# Patient Record
Sex: MALE | Race: WHITE | NOT HISPANIC OR LATINO | Employment: OTHER | URBAN - NONMETROPOLITAN AREA
[De-identification: names, ages, dates, MRNs, and addresses within clinical notes are randomized per-mention and may not be internally consistent; named-entity substitution may affect disease eponyms.]

---

## 2017-01-11 ENCOUNTER — ANTICOAGULATION VISIT (OUTPATIENT)
Dept: CARDIOLOGY | Facility: CLINIC | Age: 77
End: 2017-01-11

## 2017-01-11 LAB — INR PPP: 4

## 2017-01-13 ENCOUNTER — ANTICOAGULATION VISIT (OUTPATIENT)
Dept: CARDIOLOGY | Facility: CLINIC | Age: 77
End: 2017-01-13

## 2017-01-13 LAB — INR PPP: 2.3

## 2017-01-20 ENCOUNTER — ANTICOAGULATION VISIT (OUTPATIENT)
Dept: CARDIOLOGY | Facility: CLINIC | Age: 77
End: 2017-01-20

## 2017-01-20 LAB — INR PPP: 2.9

## 2017-02-08 ENCOUNTER — TELEPHONE (OUTPATIENT)
Dept: CARDIOLOGY | Facility: CLINIC | Age: 77
End: 2017-02-08

## 2017-02-08 ENCOUNTER — ANTICOAGULATION VISIT (OUTPATIENT)
Dept: CARDIOLOGY | Facility: CLINIC | Age: 77
End: 2017-02-08

## 2017-02-08 LAB — INR PPP: 1.8

## 2017-02-21 ENCOUNTER — ANTICOAGULATION VISIT (OUTPATIENT)
Dept: CARDIOLOGY | Facility: CLINIC | Age: 77
End: 2017-02-21

## 2017-02-21 LAB — INR PPP: 1.8

## 2017-02-28 ENCOUNTER — ANTICOAGULATION VISIT (OUTPATIENT)
Dept: CARDIOLOGY | Facility: CLINIC | Age: 77
End: 2017-02-28

## 2017-02-28 LAB — INR PPP: 2.6

## 2017-03-07 ENCOUNTER — ANTICOAGULATION VISIT (OUTPATIENT)
Dept: CARDIOLOGY | Facility: CLINIC | Age: 77
End: 2017-03-07

## 2017-03-07 LAB — INR PPP: 2.8

## 2017-03-22 ENCOUNTER — OFFICE VISIT (OUTPATIENT)
Dept: CARDIOLOGY | Facility: CLINIC | Age: 77
End: 2017-03-22

## 2017-03-22 ENCOUNTER — LAB (OUTPATIENT)
Dept: LAB | Facility: HOSPITAL | Age: 77
End: 2017-03-22
Attending: INTERNAL MEDICINE

## 2017-03-22 VITALS
DIASTOLIC BLOOD PRESSURE: 58 MMHG | HEART RATE: 71 BPM | HEIGHT: 70 IN | SYSTOLIC BLOOD PRESSURE: 118 MMHG | WEIGHT: 119.8 LBS | BODY MASS INDEX: 17.15 KG/M2

## 2017-03-22 DIAGNOSIS — Z95.1 S/P CABG X 3: ICD-10-CM

## 2017-03-22 DIAGNOSIS — I48.20 CHRONIC ATRIAL FIBRILLATION (HCC): Chronic | ICD-10-CM

## 2017-03-22 DIAGNOSIS — I10 ESSENTIAL HYPERTENSION: Chronic | ICD-10-CM

## 2017-03-22 DIAGNOSIS — I10 ESSENTIAL HYPERTENSION: ICD-10-CM

## 2017-03-22 DIAGNOSIS — I48.20 CHRONIC ATRIAL FIBRILLATION (HCC): ICD-10-CM

## 2017-03-22 DIAGNOSIS — R06.09 DYSPNEA ON EXERTION: ICD-10-CM

## 2017-03-22 DIAGNOSIS — I25.10 CORONARY ARTERY DISEASE INVOLVING NATIVE CORONARY ARTERY OF NATIVE HEART WITHOUT ANGINA PECTORIS: Primary | Chronic | ICD-10-CM

## 2017-03-22 LAB
ANION GAP SERPL CALCULATED.3IONS-SCNC: 8 MMOL/L (ref 4–13)
BUN BLD-MCNC: 30 MG/DL (ref 5–21)
BUN/CREAT SERPL: 31.3 (ref 7–25)
CALCIUM SPEC-SCNC: 8.7 MG/DL (ref 8.4–10.4)
CHLORIDE SERPL-SCNC: 102 MMOL/L (ref 98–110)
CO2 SERPL-SCNC: 35 MMOL/L (ref 24–31)
CREAT BLD-MCNC: 0.96 MG/DL (ref 0.5–1.4)
GFR SERPL CREATININE-BSD FRML MDRD: 76 ML/MIN/1.73
GLUCOSE BLD-MCNC: 110 MG/DL (ref 70–100)
POTASSIUM BLD-SCNC: 3.8 MMOL/L (ref 3.5–5.3)
SODIUM BLD-SCNC: 145 MMOL/L (ref 135–145)

## 2017-03-22 PROCEDURE — 80048 BASIC METABOLIC PNL TOTAL CA: CPT | Performed by: INTERNAL MEDICINE

## 2017-03-22 PROCEDURE — 36415 COLL VENOUS BLD VENIPUNCTURE: CPT

## 2017-03-22 PROCEDURE — 93000 ELECTROCARDIOGRAM COMPLETE: CPT | Performed by: NURSE PRACTITIONER

## 2017-03-22 PROCEDURE — 99213 OFFICE O/P EST LOW 20 MIN: CPT | Performed by: NURSE PRACTITIONER

## 2017-03-22 RX ORDER — WARFARIN SODIUM 5 MG/1
5 TABLET ORAL
COMMUNITY
End: 2017-05-08 | Stop reason: SDUPTHER

## 2017-03-22 NOTE — PROGRESS NOTES
Subjective:     Encounter Date:03/22/2017      Patient ID: Emelina Phillips is a 76 y.o. male.  He presents today for follow up of atrial fibrillation and CAD s/p CABG X3 (LIMA to LAD, SVG to Diagonal and SVG to RCA).  He has a history of HTN.  He reports chronic dyspnea with exertion that has been an ongoing problem and has not worsened.  He denies chest pain, palpitations, dizziness, syncope, fatigue, decreased exercise tolerance and swelling.  He states that his HTN is managed by his PCP and is reported to be well controlled.  A BMP and digoxin level were ordered for four weeks from the last office visit in December, but this was not completed.  He states that overall he has felt well since his last visit.      Chief Complaint:  Atrial Fibrillation   Presents for follow-up visit. Symptoms are negative for an AICD problem, bradycardia, chest pain, dizziness, hemodynamic instability, hypertension, hypotension, pacemaker problem, palpitations, syncope, tachycardia and weakness. The symptoms have been stable. Past medical history includes atrial fibrillation and CAD.   Coronary Artery Disease   Presents for follow-up visit. Pertinent negatives include no chest pain, chest pressure, chest tightness, dizziness, leg swelling, muscle weakness, palpitations or weight gain. Risk factors do not include hypertension. The symptoms have been stable. Compliance with diet is variable. Compliance with exercise is variable. Compliance with medications is good.   Hypertension   This is a chronic problem. The problem is controlled. Pertinent negatives include no anxiety, blurred vision, chest pain, headaches, malaise/fatigue, neck pain, orthopnea, palpitations, peripheral edema, PND or sweats. Past treatments include angiotensin blockers and beta blockers. The current treatment provides significant improvement. Hypertensive end-organ damage includes CAD/MI.   Shortness of Breath   This is a chronic problem. The current episode  started more than 1 year ago. The problem occurs intermittently. The problem has been unchanged. Pertinent negatives include no abdominal pain, chest pain, claudication, coryza, ear pain, fever, headaches, hemoptysis, leg pain, leg swelling, neck pain, orthopnea, PND, rash, rhinorrhea, sore throat, sputum production, swollen glands, syncope, vomiting or wheezing. The symptoms are aggravated by exercise. His past medical history is significant for CAD.       The following portions of the patient's history were reviewed and updated as appropriate: allergies, current medications, past family history, past medical history, past social history, past surgical history and problem list.     No Known Allergies    Current Outpatient Prescriptions:   •  atorvastatin (LIPITOR) 10 MG tablet, Take 10 mg by mouth Daily., Disp: , Rfl:   •  budesonide-formoterol (SYMBICORT) 160-4.5 MCG/ACT inhaler, Inhale 2 puffs 2 (Two) Times a Day., Disp: , Rfl:   •  digoxin (LANOXIN) 125 MCG tablet, Take 1 tablet by mouth Daily., Disp: 90 tablet, Rfl: 3  •  hydrochlorothiazide (MICROZIDE) 12.5 MG capsule, Take 1 capsule by mouth Daily., Disp: 90 capsule, Rfl: 3  •  ipratropium-albuterol (DUO-NEB) 0.5-2.5 mg/mL nebulizer, As Needed., Disp: , Rfl: 5  •  losartan (COZAAR) 100 MG tablet, Take 1 tablet by mouth Daily., Disp: 90 tablet, Rfl: 3  •  metoprolol tartrate (LOPRESSOR) 100 MG tablet, Take 100 mg by mouth 2 (Two) Times a Day., Disp: , Rfl:   •  warfarin (COUMADIN) 5 MG tablet, Take 5 mg by mouth Daily. 1 tablet by mouth daily except on Sunday.  On Sunday 0.5 tablet., Disp: , Rfl:   Past Medical History:   Diagnosis Date   • A-fib    • CAD (coronary artery disease)    • Chest pain    • COPD (chronic obstructive pulmonary disease)    • Emphysema/COPD    • Hematochezia    • Hyperlipidemia    • Hypertension    • Shortness of breath      Past Surgical History:   Procedure Laterality Date   • CORONARY ARTERY BYPASS GRAFT     • CORONARY STENT  PLACEMENT      x1   • TONSILLECTOMY       Family History   Problem Relation Age of Onset   • Heart disease Father    • Heart disease Brother    • Cancer Brother    • Heart disease Mother    • Cancer Brother      Social History     Social History   • Marital status:      Spouse name: N/A   • Number of children: N/A   • Years of education: N/A     Occupational History   • Not on file.     Social History Main Topics   • Smoking status: Former Smoker     Packs/day: 1.50     Years: 50.00     Types: Cigarettes     Quit date: 2007   • Smokeless tobacco: Never Used   • Alcohol use No   • Drug use: No   • Sexual activity: Defer     Other Topics Concern   • Not on file     Social History Narrative         Review of Systems   Constitution: Negative for chills, decreased appetite, fever, weakness, malaise/fatigue, night sweats, weight gain and weight loss.   HENT: Negative for ear pain, headaches, nosebleeds, rhinorrhea and sore throat.    Eyes: Negative for blurred vision and visual disturbance.   Cardiovascular: Positive for dyspnea on exertion. Negative for chest pain, claudication, leg swelling, near-syncope, orthopnea, palpitations, paroxysmal nocturnal dyspnea and syncope.   Respiratory: Negative for chest tightness, cough, hemoptysis, snoring, sputum production and wheezing.    Endocrine: Negative for cold intolerance and heat intolerance.   Hematologic/Lymphatic: Does not bruise/bleed easily.   Skin: Negative for rash.   Musculoskeletal: Negative for back pain, falls, muscle weakness and neck pain.   Gastrointestinal: Negative for abdominal pain, change in bowel habit, constipation, diarrhea, dysphagia, heartburn, nausea and vomiting.   Genitourinary: Negative for hematuria.   Neurological: Negative for dizziness and light-headedness.   Psychiatric/Behavioral: Negative for altered mental status.   Allergic/Immunologic: Negative for persistent infections.         ECG 12 Lead  Date/Time: 3/22/2017 11:12  "AM  Performed by: JOSH JOY  Authorized by: JOSH JOY   Comparison: compared with previous ECG from 12/20/2016  Similar to previous ECG  Rhythm: atrial flutter and atrial fibrillation  Rate: normal  BPM: 71  Clinical impression: abnormal ECG        /58  Pulse 71  Ht 70\" (177.8 cm)  Wt 119 lb 12.8 oz (54.3 kg)  BMI 17.19 kg/m2         Objective:     Physical Exam   Constitutional: He is oriented to person, place, and time. Vital signs are normal. He appears well-developed and well-nourished. No distress.   HENT:   Head: Normocephalic and atraumatic.   Right Ear: External ear normal.   Left Ear: External ear normal.   Eyes: Conjunctivae are normal. Pupils are equal, round, and reactive to light. Right eye exhibits no discharge. Left eye exhibits no discharge.   Neck: Normal range of motion. Neck supple. No JVD present. Carotid bruit is not present. No thyromegaly present.   Cardiovascular: Normal rate, normal heart sounds and intact distal pulses.  An irregularly irregular rhythm present. PMI is not displaced.  Exam reveals no gallop, no friction rub and no decreased pulses.    No murmur heard.  Pulses:       Radial pulses are 2+ on the right side, and 2+ on the left side.        Dorsalis pedis pulses are 2+ on the right side, and 2+ on the left side.        Posterior tibial pulses are 2+ on the right side, and 2+ on the left side.   Pulmonary/Chest: Effort normal and breath sounds normal. No respiratory distress. He has no decreased breath sounds. He has no wheezes. He has no rhonchi. He has no rales. He exhibits no tenderness.   Abdominal: Soft. Bowel sounds are normal. He exhibits no distension. There is no tenderness.   Musculoskeletal: Normal range of motion. He exhibits no edema.   Neurological: He is alert and oriented to person, place, and time.   Skin: Skin is warm and dry. No rash noted. He is not diaphoretic. No erythema. No pallor.   Psychiatric: He has a normal mood and affect. His " behavior is normal. Judgment and thought content normal.   Vitals reviewed.        Assessment:          Diagnosis Plan   1. Coronary artery disease involving native coronary artery of native heart without angina pectoris  No clinical signs of ischemia.    2. S/P CABG x 3  Follow up in six months.     LIMA to LAD, SVG to Diagonal, SVG to RCA in 2011   3. Chronic atrial fibrillation  Rate controlled. Anticoagulated.    4. Essential hypertension  Well controlled.    5. Dyspnea on exertion  Chronic. Stable.           Plan:       1. Continue all medications as previously prescribed.  2. BMP and digoxin level today.   3. Continue heart healthy diet and regular exercise as tolerated.  4. Continue follow up with coumadin clinic.  5. Follow up with PCP for BP management and routine lab work.   6. Report any worsening shortness of breath.  Report any chest pain, palpitations, fatigue, decreased exercise tolerance, dizziness or syncope.  7. Follow up in six months with Dr. Rodriguez, or sooner if needed.

## 2017-03-22 NOTE — PROGRESS NOTES
Subjective:     Encounter Date:03/22/2017      Patient ID: Emelina Phillips is a 76 y.o. male.    Chief Complaint:  History of Present Illness    {Common H&P Review Areas:60870}    ROS    Procedures       Objective:     Physical Exam    Lab Review:       Assessment:          Diagnosis Plan   1. Coronary artery disease involving native coronary artery of native heart without angina pectoris     2. S/P CABG x 3      LIMA to LAD, SVG to Diagonal, SVG to RCA in 2011   3. Chronic atrial fibrillation     4. Essential hypertension     5. Dyspnea on exertion            Plan:

## 2017-03-23 ENCOUNTER — ANTICOAGULATION VISIT (OUTPATIENT)
Dept: CARDIOLOGY | Facility: CLINIC | Age: 77
End: 2017-03-23

## 2017-03-23 LAB — INR PPP: 3

## 2017-03-23 RX ORDER — DIGOXIN 125 UG/1
TABLET ORAL
Qty: 90 TABLET | Refills: 2 | Status: SHIPPED | OUTPATIENT
Start: 2017-03-23

## 2017-04-07 ENCOUNTER — ANTICOAGULATION VISIT (OUTPATIENT)
Dept: CARDIOLOGY | Facility: CLINIC | Age: 77
End: 2017-04-07

## 2017-04-07 LAB — INR PPP: 2.9

## 2017-04-21 ENCOUNTER — ANTICOAGULATION VISIT (OUTPATIENT)
Dept: CARDIOLOGY | Facility: CLINIC | Age: 77
End: 2017-04-21

## 2017-04-21 LAB — INR PPP: 2

## 2017-05-08 RX ORDER — WARFARIN SODIUM 5 MG/1
5 TABLET ORAL
Qty: 45 TABLET | Refills: 5 | Status: SHIPPED | OUTPATIENT
Start: 2017-05-08 | End: 2018-01-05 | Stop reason: SDUPTHER

## 2017-05-17 ENCOUNTER — ANTICOAGULATION VISIT (OUTPATIENT)
Dept: CARDIOLOGY | Facility: CLINIC | Age: 77
End: 2017-05-17

## 2017-05-17 LAB — INR PPP: 1.5

## 2017-05-25 ENCOUNTER — ANTICOAGULATION VISIT (OUTPATIENT)
Dept: CARDIOLOGY | Facility: CLINIC | Age: 77
End: 2017-05-25

## 2017-05-25 LAB — INR PPP: 1.9

## 2017-06-01 ENCOUNTER — ANTICOAGULATION VISIT (OUTPATIENT)
Dept: CARDIOLOGY | Facility: CLINIC | Age: 77
End: 2017-06-01

## 2017-06-01 LAB — INR PPP: 2.6

## 2017-06-15 ENCOUNTER — ANTICOAGULATION VISIT (OUTPATIENT)
Dept: CARDIOLOGY | Facility: CLINIC | Age: 77
End: 2017-06-15

## 2017-06-15 LAB — INR PPP: 3.1

## 2017-06-27 ENCOUNTER — ANTICOAGULATION VISIT (OUTPATIENT)
Dept: CARDIOLOGY | Facility: CLINIC | Age: 77
End: 2017-06-27

## 2017-06-27 LAB — INR PPP: 2.3

## 2017-07-31 ENCOUNTER — DOCUMENTATION (OUTPATIENT)
Dept: CARDIOLOGY | Facility: CLINIC | Age: 77
End: 2017-07-31

## 2017-08-01 ENCOUNTER — ANTICOAGULATION VISIT (OUTPATIENT)
Dept: CARDIOLOGY | Facility: CLINIC | Age: 77
End: 2017-08-01

## 2017-08-01 LAB — INR PPP: 5.2

## 2017-08-02 ENCOUNTER — ANTICOAGULATION VISIT (OUTPATIENT)
Dept: CARDIOLOGY | Facility: CLINIC | Age: 77
End: 2017-08-02

## 2017-08-02 LAB — INR PPP: 1.5

## 2017-08-09 ENCOUNTER — ANTICOAGULATION VISIT (OUTPATIENT)
Dept: CARDIOLOGY | Facility: CLINIC | Age: 77
End: 2017-08-09

## 2017-08-09 LAB — INR PPP: 1.4

## 2017-08-16 ENCOUNTER — ANTICOAGULATION VISIT (OUTPATIENT)
Dept: CARDIOLOGY | Facility: CLINIC | Age: 77
End: 2017-08-16

## 2017-08-16 LAB — INR PPP: 2.4

## 2017-08-23 DIAGNOSIS — I48.20 CHRONIC ATRIAL FIBRILLATION (HCC): Primary | ICD-10-CM

## 2017-08-24 ENCOUNTER — ANTICOAGULATION VISIT (OUTPATIENT)
Dept: CARDIOLOGY | Facility: CLINIC | Age: 77
End: 2017-08-24

## 2017-08-24 LAB — INR PPP: 1.8

## 2017-08-30 ENCOUNTER — ANTICOAGULATION VISIT (OUTPATIENT)
Dept: CARDIOLOGY | Facility: CLINIC | Age: 77
End: 2017-08-30

## 2017-08-30 LAB — INR PPP: 2.6

## 2017-09-07 ENCOUNTER — ANTICOAGULATION VISIT (OUTPATIENT)
Dept: CARDIOLOGY | Facility: CLINIC | Age: 77
End: 2017-09-07

## 2017-09-07 LAB — INR PPP: 2.2

## 2017-09-20 LAB — INR PPP: 2.6

## 2017-09-21 ENCOUNTER — ANTICOAGULATION VISIT (OUTPATIENT)
Dept: CARDIOLOGY | Facility: CLINIC | Age: 77
End: 2017-09-21

## 2017-09-22 ENCOUNTER — OFFICE VISIT (OUTPATIENT)
Dept: CARDIOLOGY | Facility: CLINIC | Age: 77
End: 2017-09-22

## 2017-09-22 VITALS
SYSTOLIC BLOOD PRESSURE: 140 MMHG | OXYGEN SATURATION: 97 % | BODY MASS INDEX: 17.72 KG/M2 | HEART RATE: 87 BPM | HEIGHT: 70 IN | WEIGHT: 123.8 LBS | DIASTOLIC BLOOD PRESSURE: 70 MMHG

## 2017-09-22 DIAGNOSIS — I10 ESSENTIAL HYPERTENSION: ICD-10-CM

## 2017-09-22 DIAGNOSIS — J41.0 SIMPLE CHRONIC BRONCHITIS (HCC): ICD-10-CM

## 2017-09-22 DIAGNOSIS — I25.10 CORONARY ARTERY DISEASE INVOLVING NATIVE CORONARY ARTERY OF NATIVE HEART WITHOUT ANGINA PECTORIS: ICD-10-CM

## 2017-09-22 DIAGNOSIS — R06.09 DYSPNEA ON EXERTION: ICD-10-CM

## 2017-09-22 DIAGNOSIS — Z95.5 STENTED CORONARY ARTERY: ICD-10-CM

## 2017-09-22 DIAGNOSIS — I48.20 CHRONIC ATRIAL FIBRILLATION (HCC): Primary | ICD-10-CM

## 2017-09-22 DIAGNOSIS — Z95.1 S/P CABG X 3: ICD-10-CM

## 2017-09-22 PROCEDURE — 99214 OFFICE O/P EST MOD 30 MIN: CPT | Performed by: INTERNAL MEDICINE

## 2017-09-22 PROCEDURE — 93000 ELECTROCARDIOGRAM COMPLETE: CPT | Performed by: INTERNAL MEDICINE

## 2017-09-22 NOTE — PROGRESS NOTES
Emelina Phillips  [id number]  1940  77 y.o.  male    Referring Provider: Phil Pringle MD    Reason for Follow-up Visit: Chronic Atrial Fibrillation  Overall feeling well   No chest pain   Moderate shortness of breath   No palpitations  Compliant with medications  Decreased effort tolerance       History of present illness:  Emelina Phillips is a 77 y.o. yo male with history of chronic atrial fibrillation COPD  who presents today for   Chief Complaint   Patient presents with   • Coronary Artery Disease     6 mo fu    • Atrial Fibrillation   • Hypertension   .    History  Past Medical History:   Diagnosis Date   • A-fib    • Abnormal cardiovascular function study     lexiscan   • CAD (coronary artery disease)    • CAD in native artery    • Chest pain    • Chest pain    • COPD (chronic obstructive pulmonary disease)    • Emphysema/COPD    • Hematochezia    • Hematochezia    • Hyperlipidemia    • Hyperlipidemia, mixed    • Hypertension    • Hypertension, benign    • S/P CABG (coronary artery bypass graft)    • Shortness of breath    ,   Past Surgical History:   Procedure Laterality Date   • CARDIAC CATHETERIZATION Left 04/14/2011   • CARDIAC CATHETERIZATION Left 01/23/2001    stent to RCA   • CORONARY ARTERY BYPASS GRAFT     • CORONARY STENT PLACEMENT      x1   • OTHER SURGICAL HISTORY  05/24/2011    [facility] STATUS, AORTOCORONARY BYPASS -  grafting - 3 vessel   • TONSILLECTOMY     ,   Family History   Problem Relation Age of Onset   • Heart disease Father    • Coronary artery disease Father    • Heart disease Brother    • Cancer Brother    • Coronary artery disease Brother    • Heart disease Mother    • Cancer Brother    ,   Social History   Substance Use Topics   • Smoking status: Former Smoker     Packs/day: 1.50     Years: 50.00     Types: Cigarettes     Quit date: 2007   • Smokeless tobacco: Never Used   • Alcohol use No   ,     Medications  Current Outpatient Prescriptions   Medication Sig Dispense Refill  "  • atorvastatin (LIPITOR) 10 MG tablet Take 10 mg by mouth Daily.     • budesonide-formoterol (SYMBICORT) 160-4.5 MCG/ACT inhaler Inhale 2 puffs 2 (Two) Times a Day.     • DIGOX 125 MCG tablet Take 1 tablet by mouth every day 90 tablet 2   • hydrochlorothiazide (MICROZIDE) 12.5 MG capsule Take 1 capsule by mouth Daily. 90 capsule 3   • ipratropium-albuterol (DUO-NEB) 0.5-2.5 mg/mL nebulizer As Needed.  5   • losartan (COZAAR) 100 MG tablet Take 1 tablet by mouth Daily. 90 tablet 3   • metoprolol tartrate (LOPRESSOR) 100 MG tablet Take 100 mg by mouth 2 (Two) Times a Day.     • warfarin (COUMADIN) 5 MG tablet Take 1 tablet by mouth Daily. 1 tablet by mouth daily except on Sunday.  On Sunday 0.5 tablet. 45 tablet 5     No current facility-administered medications for this visit.        Allergies:  Review of patient's allergies indicates no known allergies.    Review of Systems  Review of Systems   Constitution: Positive for malaise/fatigue.   HENT: Negative.    Eyes: Negative.    Cardiovascular: Positive for dyspnea on exertion. Negative for chest pain, claudication, cyanosis, irregular heartbeat, leg swelling, near-syncope, orthopnea, palpitations, paroxysmal nocturnal dyspnea and syncope.   Respiratory: Negative.    Endocrine: Negative.    Hematologic/Lymphatic: Negative.    Skin: Negative.    Gastrointestinal: Negative for anorexia.   Genitourinary: Negative.    Neurological: Negative.    Psychiatric/Behavioral: Negative.        Objective     Physical Exam:  /70 (BP Location: Right arm, Patient Position: Sitting)  Pulse 87  Ht 70\" (177.8 cm)  Wt 123 lb 12.8 oz (56.2 kg)  SpO2 97%  BMI 17.76 kg/m2  Physical Exam   Constitutional: He appears well-developed.   HENT:   Head: Normocephalic.   Neck: Normal carotid pulses and no JVD present. No tracheal tenderness present. Carotid bruit is not present. No tracheal deviation and no edema present.   Cardiovascular: Normal pulses.  An irregularly irregular rhythm " present.   Murmur heard.   Systolic murmur is present with a grade of 2/6   Pulmonary/Chest: Effort normal. No stridor.   Abdominal: Soft.   Neurological: He is alert. He has normal strength. No cranial nerve deficit or sensory deficit.   Skin: Skin is warm.   Psychiatric: He has a normal mood and affect. His speech is normal and behavior is normal.       Results Review:       ECG 12 Lead  Date/Time: 9/22/2017 11:10 AM  Performed by: ROBERTH MARIA  Authorized by: ROBERTH MARIA   Comparison: compared with previous ECG from 3/22/2017  Similar to previous ECG  Rhythm: atrial fibrillation  Rate: normal  Conduction: right bundle branch block  ST Segments: ST segments normal  QRS axis: right  Clinical impression: abnormal ECG            Assessment/Plan   Patient Active Problem List   Diagnosis   • Atrial fibrillation [I48.91]   • Essential hypertension   • Simple chronic bronchitis   • Coronary artery disease involving native coronary artery of native heart without angina pectoris   • S/P CABG x 3   • Dyspnea on exertion   • Stented coronary artery       Stable doing well. No exertional chest pain or excessive dyspnea. No palpitations. No significant pedal edema. Compliant with medications and diet. Latest labs and medications reviewed.  He wants to wait for stress test      Plan:  Echo  Close follow up with you as scheduled.  Intensive factor modifications.  See order list.    Counseled regarding disease appropriate diet, fluid, caffeine, stimulants and sodium intake as well as importance of compliance to diet, exercise and regular follow up.  Avoid NSAIDS and COX2 inhibitors. Use Acetaminophen PRN.   Close follow up with you as scheduled.  Intensive factor modifications.  See order list.   Monitor BMP and digoxin level in 3-4 weeks    Return in about 6 months (around 3/22/2018).

## 2017-10-04 ENCOUNTER — ANTICOAGULATION VISIT (OUTPATIENT)
Dept: CARDIOLOGY | Facility: CLINIC | Age: 77
End: 2017-10-04

## 2017-10-04 LAB — INR PPP: 2.4

## 2017-10-18 ENCOUNTER — ANTICOAGULATION VISIT (OUTPATIENT)
Dept: CARDIOLOGY | Facility: CLINIC | Age: 77
End: 2017-10-18

## 2017-10-18 LAB — INR PPP: 5.6

## 2017-10-20 ENCOUNTER — ANTICOAGULATION VISIT (OUTPATIENT)
Dept: CARDIOLOGY | Facility: CLINIC | Age: 77
End: 2017-10-20

## 2017-10-20 LAB — INR PPP: 1.8

## 2017-11-01 ENCOUNTER — ANTICOAGULATION VISIT (OUTPATIENT)
Dept: CARDIOLOGY | Facility: CLINIC | Age: 77
End: 2017-11-01

## 2017-11-01 LAB — INR PPP: 2

## 2017-11-08 ENCOUNTER — ANTICOAGULATION VISIT (OUTPATIENT)
Dept: CARDIOLOGY | Facility: CLINIC | Age: 77
End: 2017-11-08

## 2017-11-08 LAB — INR PPP: 2

## 2017-12-07 ENCOUNTER — ANTICOAGULATION VISIT (OUTPATIENT)
Dept: CARDIOLOGY | Facility: CLINIC | Age: 77
End: 2017-12-07

## 2017-12-07 LAB — INR PPP: 1.4

## 2017-12-27 ENCOUNTER — ANTICOAGULATION VISIT (OUTPATIENT)
Dept: CARDIOLOGY | Facility: CLINIC | Age: 77
End: 2017-12-27

## 2017-12-27 LAB — INR PPP: 2.2

## 2018-01-05 ENCOUNTER — ANTICOAGULATION VISIT (OUTPATIENT)
Dept: CARDIOLOGY | Facility: CLINIC | Age: 78
End: 2018-01-05

## 2018-01-08 RX ORDER — WARFARIN SODIUM 5 MG/1
TABLET ORAL
Qty: 45 TABLET | Refills: 5 | Status: SHIPPED | OUTPATIENT
Start: 2018-01-08 | End: 2018-10-06 | Stop reason: SDUPTHER

## 2018-02-02 ENCOUNTER — ANTICOAGULATION VISIT (OUTPATIENT)
Dept: CARDIOLOGY | Facility: CLINIC | Age: 78
End: 2018-02-02

## 2018-02-08 ENCOUNTER — ANTICOAGULATION VISIT (OUTPATIENT)
Dept: CARDIOLOGY | Facility: CLINIC | Age: 78
End: 2018-02-08

## 2018-02-08 LAB — INR PPP: 2.8

## 2018-02-20 ENCOUNTER — TELEPHONE (OUTPATIENT)
Dept: CARDIOLOGY | Facility: CLINIC | Age: 78
End: 2018-02-20

## 2018-03-08 ENCOUNTER — ANTICOAGULATION VISIT (OUTPATIENT)
Dept: CARDIOLOGY | Facility: CLINIC | Age: 78
End: 2018-03-08

## 2018-03-08 LAB — INR PPP: 1.4

## 2018-03-14 ENCOUNTER — ANTICOAGULATION VISIT (OUTPATIENT)
Dept: CARDIOLOGY | Facility: CLINIC | Age: 78
End: 2018-03-14

## 2018-03-14 LAB — INR PPP: 4.6

## 2018-03-16 ENCOUNTER — ANTICOAGULATION VISIT (OUTPATIENT)
Dept: CARDIOLOGY | Facility: CLINIC | Age: 78
End: 2018-03-16

## 2018-03-16 LAB — INR PPP: 3

## 2018-03-22 ENCOUNTER — OFFICE VISIT (OUTPATIENT)
Dept: CARDIOLOGY | Facility: CLINIC | Age: 78
End: 2018-03-22

## 2018-03-22 ENCOUNTER — ANTICOAGULATION VISIT (OUTPATIENT)
Dept: CARDIOLOGY | Facility: CLINIC | Age: 78
End: 2018-03-22

## 2018-03-22 VITALS
SYSTOLIC BLOOD PRESSURE: 113 MMHG | DIASTOLIC BLOOD PRESSURE: 62 MMHG | BODY MASS INDEX: 17.61 KG/M2 | OXYGEN SATURATION: 98 % | HEART RATE: 59 BPM | HEIGHT: 70 IN | WEIGHT: 123 LBS

## 2018-03-22 DIAGNOSIS — I48.20 CHRONIC ATRIAL FIBRILLATION (HCC): ICD-10-CM

## 2018-03-22 DIAGNOSIS — R79.9 ABNORMAL FINDING OF BLOOD CHEMISTRY: ICD-10-CM

## 2018-03-22 DIAGNOSIS — Z95.5 STENTED CORONARY ARTERY: ICD-10-CM

## 2018-03-22 DIAGNOSIS — J41.0 SIMPLE CHRONIC BRONCHITIS (HCC): ICD-10-CM

## 2018-03-22 DIAGNOSIS — I25.10 CORONARY ARTERY DISEASE INVOLVING NATIVE CORONARY ARTERY OF NATIVE HEART WITHOUT ANGINA PECTORIS: ICD-10-CM

## 2018-03-22 DIAGNOSIS — Z95.1 S/P CABG X 3: Primary | ICD-10-CM

## 2018-03-22 DIAGNOSIS — I10 ESSENTIAL HYPERTENSION: ICD-10-CM

## 2018-03-22 DIAGNOSIS — R00.1 BRADYCARDIA: ICD-10-CM

## 2018-03-22 DIAGNOSIS — R06.09 DYSPNEA ON EXERTION: ICD-10-CM

## 2018-03-22 LAB — INR PPP: 1.6

## 2018-03-22 PROCEDURE — 99214 OFFICE O/P EST MOD 30 MIN: CPT | Performed by: INTERNAL MEDICINE

## 2018-03-22 PROCEDURE — 93000 ELECTROCARDIOGRAM COMPLETE: CPT | Performed by: INTERNAL MEDICINE

## 2018-03-22 NOTE — PROGRESS NOTES
Emelina Phillips  [id number]  1940  77 y.o.  male    Referring Provider: Phil Pringle MD    Reason for Follow-up Visit: Here for routine follow up   Chronic Atrial Fibrillation  Overall feeling well   No chest pain   Moderate  exertional shortness of breath on exertion relieved with rest  Now Increasing severity and frequency of  Shortness of breath   Easy fatiguability   No significant cough or wheezing  Going on for several months  No palpitations  No associated chest pain  No significant pedal edema  No fever or chills  No significant expectoration  No hemoptysis  No presyncope or syncope    No palpitations  Compliant with medications  Decreased effort tolerance  Lost weight but gaining back      History of present illness:  Emelina Phillips is a 77 y.o. yo male with history of chronic atrial fibrillation COPD  who presents today for   Chief Complaint   Patient presents with   • Atrial Fibrillation     6 mon fu    • Shortness of Breath   .    History  Past Medical History:   Diagnosis Date   • A-fib    • Abnormal cardiovascular function study     lexiscan   • CAD (coronary artery disease)    • CAD in native artery    • Chest pain    • Chest pain    • COPD (chronic obstructive pulmonary disease)    • Emphysema/COPD    • Hematochezia    • Hematochezia    • Hyperlipidemia    • Hyperlipidemia, mixed    • Hypertension    • Hypertension, benign    • S/P CABG (coronary artery bypass graft)    • Shortness of breath    ,   Past Surgical History:   Procedure Laterality Date   • CARDIAC CATHETERIZATION Left 04/14/2011   • CARDIAC CATHETERIZATION Left 01/23/2001    stent to RCA   • CORONARY ARTERY BYPASS GRAFT      x 1   • CORONARY STENT PLACEMENT      x1   • OTHER SURGICAL HISTORY  05/24/2011    [facility] STATUS, AORTOCORONARY BYPASS -  grafting - 3 vessel   • TONSILLECTOMY     ,   Family History   Problem Relation Age of Onset   • Heart disease Father    • Coronary artery disease Father    • Heart disease Brother   "  • Cancer Brother    • Coronary artery disease Brother    • Heart disease Mother    • Cancer Brother    ,   Social History   Substance Use Topics   • Smoking status: Former Smoker     Packs/day: 1.50     Years: 50.00     Types: Cigarettes     Quit date: 2007   • Smokeless tobacco: Never Used   • Alcohol use No   ,     Medications  Current Outpatient Prescriptions   Medication Sig Dispense Refill   • atorvastatin (LIPITOR) 10 MG tablet Take 10 mg by mouth Daily.     • budesonide-formoterol (SYMBICORT) 160-4.5 MCG/ACT inhaler Inhale 2 puffs 2 (Two) Times a Day.     • DIGOX 125 MCG tablet Take 1 tablet by mouth every day 90 tablet 2   • hydrochlorothiazide (MICROZIDE) 12.5 MG capsule Take 1 capsule by mouth Daily. 90 capsule 3   • ipratropium-albuterol (DUO-NEB) 0.5-2.5 mg/mL nebulizer As Needed.  5   • losartan (COZAAR) 100 MG tablet Take 1 tablet by mouth Daily. 90 tablet 3   • metoprolol tartrate (LOPRESSOR) 100 MG tablet Take 100 mg by mouth 2 (Two) Times a Day.     • warfarin (COUMADIN) 5 MG tablet TAKE 1 TABLET BY MOUTH ONCE A DAY, EXCEPT TAKE 1/2 TABLET ON SUNDAYS. 45 tablet 5     No current facility-administered medications for this visit.        Allergies:  Review of patient's allergies indicates no known allergies.    Review of Systems  Review of Systems   Constitution: Positive for malaise/fatigue and weight loss.   HENT: Negative.    Eyes: Negative.    Cardiovascular: Positive for dyspnea on exertion. Negative for chest pain, claudication, cyanosis, irregular heartbeat, leg swelling, near-syncope, orthopnea, palpitations, paroxysmal nocturnal dyspnea and syncope.   Respiratory: Negative.    Endocrine: Negative.    Hematologic/Lymphatic: Negative.    Skin: Negative.    Gastrointestinal: Negative for anorexia.   Genitourinary: Negative.    Neurological: Negative.    Psychiatric/Behavioral: Negative.        Objective     Physical Exam:  /62   Pulse 59   Ht 177.8 cm (70\")   Wt 55.8 kg (123 lb)   SpO2 " 98%   BMI 17.65 kg/m²   Physical Exam   Constitutional: He appears well-developed.   HENT:   Head: Normocephalic.   Neck: Normal carotid pulses and no JVD present. No tracheal tenderness present. Carotid bruit is not present. No tracheal deviation and no edema present.   Cardiovascular: Normal pulses.  An irregularly irregular rhythm present.   Murmur heard.   Systolic murmur is present with a grade of 2/6   Pulmonary/Chest: Effort normal. No stridor.   Abdominal: Soft.   Neurological: He is alert. He has normal strength. No cranial nerve deficit or sensory deficit.   Skin: Skin is warm.   Psychiatric: He has a normal mood and affect. His speech is normal and behavior is normal.       Results Review:  Results for orders placed during the hospital encounter of 01/26/16   SCANNED - ECHOCARDIOGRAM          ECG 12 Lead  Date/Time: 3/22/2018 11:22 AM  Performed by: ROBERTH MARIA  Authorized by: ROBERTH MARIA   Comparison: compared with previous ECG from 9/22/2017  Comparison to previous ECG: Ventricular rate decreased from 87    to  57    beats per minute   Rhythm: atrial fibrillation  Rate: bradycardic  Conduction: complete RBBB  QRS axis: normal  Q waves: II, III and aVF  Clinical impression: abnormal ECG            Assessment/Plan   Patient Active Problem List   Diagnosis   • Atrial fibrillation [I48.91]   • Essential hypertension   • Simple chronic bronchitis   • Coronary artery disease involving native coronary artery of native heart without angina pectoris   • S/P CABG x 3   • Dyspnea on exertion   • Stented coronary artery       Stable doing well. No exertional chest pain or excessive dyspnea. No palpitations. No significant pedal edema. Compliant with medications and diet. Latest labs and medications reviewed.  He wants to wait for stress test      Plan:    Target INR 2-3   Monitor for any signs of bleeding including red or dark stools. Fall precautions.   Patient is asked to monitor BP at home or work, several  times per month and return with written values at next office visit.   The patient was advised that NSAID-type medications have three  very important potential side effects: cardiovascular complications, gastrointestinal irritation including hemorrhage and renal injuries.  Do not use anti-inflammatories such as Motrin/ibuprofen, Alleve/naprosyn, Mobic and like medications Use Tylenol instead.The patient expresses understanding of these issues and questions were answered.   Continue ECASA 81 mg daily regimen if applicable given risk factors and monitor for any signs of bleeding including red or dark stools. Fall precautions.     Close follow up with you as scheduled.  Intensive factor modifications.  See order list.    Counseled regarding disease appropriate diet, fluid, caffeine, stimulants and sodium intake as well as importance of compliance to diet, exercise and regular follow up.  Avoid NSAIDS and COX2 inhibitors. Use Acetaminophen PRN.   Close follow up with you as scheduled.  Intensive factor modifications.  See order list.   Orders Placed This Encounter   Procedures   • Digoxin Level     Standing Status:   Future     Standing Expiration Date:   3/22/2019   • Lipid Panel     Standing Status:   Future     Standing Expiration Date:   3/22/2019   • Comprehensive Metabolic Panel     Standing Status:   Future     Standing Expiration Date:   3/22/2019   • ECG 12 Lead     This order was created via procedure documentation   • Adult Transthoracic Echo Complete W/ Cont if Necessary Per Protocol     Standing Status:   Future     Standing Expiration Date:   3/22/2019     Order Specific Question:   Reason for exam?     Answer:   Dyspnea     Order Specific Question:   Reason for exam?     Answer:   Arrhythmia   • CBC & Differential     Standing Status:   Future     Standing Expiration Date:   3/22/2019     Order Specific Question:   Manual Differential     Answer:   No        Return in about 3 months (around  6/22/2018).

## 2018-03-26 ENCOUNTER — ANTICOAGULATION VISIT (OUTPATIENT)
Dept: CARDIOLOGY | Facility: CLINIC | Age: 78
End: 2018-03-26

## 2018-03-26 LAB — INR PPP: 1.9

## 2018-04-09 ENCOUNTER — ANTICOAGULATION VISIT (OUTPATIENT)
Dept: CARDIOLOGY | Facility: CLINIC | Age: 78
End: 2018-04-09

## 2018-04-09 LAB — INR PPP: 1.5

## 2018-04-13 ENCOUNTER — ANTICOAGULATION VISIT (OUTPATIENT)
Dept: CARDIOLOGY | Facility: CLINIC | Age: 78
End: 2018-04-13

## 2018-04-13 LAB — INR PPP: 2

## 2018-04-24 ENCOUNTER — ANTICOAGULATION VISIT (OUTPATIENT)
Dept: CARDIOLOGY | Facility: CLINIC | Age: 78
End: 2018-04-24

## 2018-05-08 LAB — INR PPP: 2.8

## 2018-05-09 ENCOUNTER — ANTICOAGULATION VISIT (OUTPATIENT)
Dept: CARDIOLOGY | Facility: CLINIC | Age: 78
End: 2018-05-09

## 2018-06-15 ENCOUNTER — ANTICOAGULATION VISIT (OUTPATIENT)
Dept: CARDIOLOGY | Facility: CLINIC | Age: 78
End: 2018-06-15

## 2018-06-15 LAB — INR PPP: 2

## 2018-06-25 ENCOUNTER — OFFICE VISIT (OUTPATIENT)
Dept: CARDIOLOGY | Facility: CLINIC | Age: 78
End: 2018-06-25

## 2018-06-25 VITALS
HEIGHT: 70 IN | SYSTOLIC BLOOD PRESSURE: 122 MMHG | WEIGHT: 129 LBS | DIASTOLIC BLOOD PRESSURE: 70 MMHG | OXYGEN SATURATION: 97 % | HEART RATE: 64 BPM | BODY MASS INDEX: 18.47 KG/M2

## 2018-06-25 DIAGNOSIS — I10 ESSENTIAL HYPERTENSION: ICD-10-CM

## 2018-06-25 DIAGNOSIS — R06.09 DYSPNEA ON EXERTION: ICD-10-CM

## 2018-06-25 DIAGNOSIS — Z95.5 STENTED CORONARY ARTERY: ICD-10-CM

## 2018-06-25 DIAGNOSIS — I25.10 CORONARY ARTERY DISEASE INVOLVING NATIVE CORONARY ARTERY OF NATIVE HEART WITHOUT ANGINA PECTORIS: ICD-10-CM

## 2018-06-25 DIAGNOSIS — I48.20 CHRONIC ATRIAL FIBRILLATION (HCC): ICD-10-CM

## 2018-06-25 DIAGNOSIS — Z95.1 S/P CABG X 3: Primary | ICD-10-CM

## 2018-06-25 DIAGNOSIS — J41.0 SIMPLE CHRONIC BRONCHITIS (HCC): ICD-10-CM

## 2018-06-25 PROCEDURE — 99214 OFFICE O/P EST MOD 30 MIN: CPT | Performed by: INTERNAL MEDICINE

## 2018-06-25 PROCEDURE — 93000 ELECTROCARDIOGRAM COMPLETE: CPT | Performed by: INTERNAL MEDICINE

## 2018-06-25 NOTE — PROGRESS NOTES
Emelina Phillips  [id number]  1940  78 y.o.  male    Referring Provider: Phil Pringle MD    Reason for Follow-up Visit: Here for routine follow up   Chronic Atrial Fibrillation    Subjective     Similar symptoms as during last visit   Overall feeling well   No chest pain   Mild chronic  exertional shortness of breath on exertion relieved with rest  Now Increasing severity and frequency of  Shortness of breath   Easy fatiguability   No significant cough or wheezing  Going on for several months  No palpitations  No associated chest pain  No significant pedal edema  No fever or chills  No significant expectoration  No hemoptysis  No presyncope or syncope    No palpitations  Compliant with medications  Decreased effort tolerance  Lost weight but gaining back      History of present illness:  Emelina Phillips is a 78 y.o. yo male with history of chronic atrial fibrillation COPD  who presents today for   Chief Complaint   Patient presents with   • Atrial Fibrillation     3  MON FU- NO SHOW FOR ECHO DUE TO INS   • Coronary Artery Disease   • Fatigue   • Shortness of Breath     WITH EXCERTON    .    History  Past Medical History:   Diagnosis Date   • A-fib    • Abnormal cardiovascular function study     lexiscan   • CAD (coronary artery disease)    • CAD in native artery    • Chest pain    • Chest pain    • COPD (chronic obstructive pulmonary disease)    • Emphysema/COPD    • Hematochezia    • Hematochezia    • Hyperlipidemia    • Hyperlipidemia, mixed    • Hypertension    • Hypertension, benign    • S/P CABG (coronary artery bypass graft)    • Shortness of breath    ,   Past Surgical History:   Procedure Laterality Date   • CARDIAC CATHETERIZATION Left 04/14/2011   • CARDIAC CATHETERIZATION Left 01/23/2001    stent to RCA   • CORONARY ARTERY BYPASS GRAFT      x 1   • CORONARY STENT PLACEMENT      x1   • OTHER SURGICAL HISTORY  05/24/2011    [facility] STATUS, AORTOCORONARY BYPASS -  grafting - 3 vessel   •  TONSILLECTOMY     ,   Family History   Problem Relation Age of Onset   • Heart disease Father    • Coronary artery disease Father    • Heart disease Brother    • Cancer Brother    • Coronary artery disease Brother    • Heart disease Mother    • Cancer Brother    ,   Social History   Substance Use Topics   • Smoking status: Former Smoker     Packs/day: 1.50     Years: 50.00     Types: Cigarettes     Quit date: 2007   • Smokeless tobacco: Never Used   • Alcohol use No   ,     Medications  Current Outpatient Prescriptions   Medication Sig Dispense Refill   • atorvastatin (LIPITOR) 10 MG tablet Take 10 mg by mouth Daily.     • budesonide-formoterol (SYMBICORT) 160-4.5 MCG/ACT inhaler Inhale 2 puffs 2 (Two) Times a Day.     • DIGOX 125 MCG tablet Take 1 tablet by mouth every day 90 tablet 2   • hydrochlorothiazide (MICROZIDE) 12.5 MG capsule Take 1 capsule by mouth Daily. 90 capsule 3   • ipratropium-albuterol (DUO-NEB) 0.5-2.5 mg/mL nebulizer As Needed.  5   • losartan (COZAAR) 100 MG tablet Take 1 tablet by mouth Daily. 90 tablet 3   • metoprolol tartrate (LOPRESSOR) 100 MG tablet Take 100 mg by mouth 2 (Two) Times a Day.     • warfarin (COUMADIN) 5 MG tablet TAKE 1 TABLET BY MOUTH ONCE A DAY, EXCEPT TAKE 1/2 TABLET ON SUNDAYS. 45 tablet 5     No current facility-administered medications for this visit.        Allergies:  Patient has no known allergies.    Review of Systems  Review of Systems   Constitution: Positive for malaise/fatigue and weight gain. Negative for weight loss.   HENT: Negative.    Eyes: Negative.    Cardiovascular: Positive for dyspnea on exertion. Negative for chest pain, claudication, cyanosis, irregular heartbeat, leg swelling, near-syncope, orthopnea, palpitations, paroxysmal nocturnal dyspnea and syncope.   Respiratory: Negative.    Endocrine: Negative.    Hematologic/Lymphatic: Negative.    Skin: Negative.    Gastrointestinal: Negative for anorexia.   Genitourinary: Negative.    Neurological:  "Negative.    Psychiatric/Behavioral: Negative.        Objective     Physical Exam:  /70   Pulse 64   Ht 177.8 cm (70\")   Wt 58.5 kg (129 lb)   SpO2 97%   BMI 18.51 kg/m²   Physical Exam   Constitutional: He appears well-developed.   HENT:   Head: Normocephalic.   Neck: Normal carotid pulses and no JVD present. No tracheal tenderness present. Carotid bruit is not present. No tracheal deviation and no edema present.   Cardiovascular: Normal pulses.  An irregularly irregular rhythm present.   Murmur heard.   Systolic murmur is present with a grade of 2/6   Pulmonary/Chest: Effort normal. No stridor.   Abdominal: Soft.   Neurological: He is alert. He has normal strength. No cranial nerve deficit or sensory deficit.   Skin: Skin is warm.   Psychiatric: He has a normal mood and affect. His speech is normal and behavior is normal.       Results Review:  Results for orders placed during the hospital encounter of 01/26/16   SCANNED - ECHOCARDIOGRAM          ECG 12 Lead  Date/Time: 6/25/2018 12:13 PM  Performed by: ROBERTH MARIA  Authorized by: ROBERTH MARIA   Comparison: compared with previous ECG from 3/22/2018  Similar to previous ECG  Rhythm: atrial fibrillation  Rate: normal  Conduction: right bundle branch block  ST Segments: ST segments normal  Clinical impression: abnormal ECG and non-specific ECG            Assessment/Plan   Patient Active Problem List   Diagnosis   • Atrial fibrillation [I48.91]   • Essential hypertension   • Simple chronic bronchitis   • Coronary artery disease involving native coronary artery of native heart without angina pectoris   • S/P CABG x 3   • Dyspnea on exertion   • Stented coronary artery       Stable doing well. No exertional chest pain or excessive dyspnea. No palpitations. No significant pedal edema. Compliant with medications and diet. Latest labs and medications reviewed.  He wants to wait for stress test      Plan:    Target INR 2-3     Low salt/ HTN/ Heart healthy " "carbohydrate restricted cardiac diet as applicable to this patient's current diagnoses.   This handout has relevant information regarding shopping for food, preparing meals, what to eat at restaurants, tracking of food intake, information regarding sodium intake and salt content, how to read food labels, knowing what to eat, tips reagarding physical activity, calorie count and calorie expenditure. What foods to avoid. Information regarding alcoholic drinks along with \"good\" and \"bad\" fats.  Low vitamin K diet.  knowing what to eat, tips what foods to avoid.   Reiterated prior recommendations     The patient is advised to reduce or avoid caffeine or other cardiac stimulants.     The patient was advised that NSAID-type medications have three  very important potential side effects: cardiovascular complications, gastrointestinal irritation including hemorrhage and renal injuries.  Do not use anti-inflammatories such as Motrin/ibuprofen, Alleve/naprosyn, Mobic and like medications Use Tylenol instead.The patient expresses understanding of these issues and questions were answered.   Monitor for any signs of bleeding including red or dark stools. Fall precautions.       Monitor for any signs of bleeding including red or dark stools. Fall precautions.   Patient is asked to monitor BP at home or work, several times per month and return with written values at next office visit.     Advised staying uptodate with immunizations per established standard guidelines.    Offered to give patient  a copy of my notes and relevant tests/ prior ECG etc for the patient to review and follow specific advise and relevant findings if any, prognosis, along with my current and future plans.       Conservative medical therapy per patient. Risks, benefits and alternatives explained. The patient understands and wishes to proceed with only conservative therapy.     No additional cardiac testing required at this point in time. He declined any " cardiac testing due to cost           Return in about 6 months (around 12/25/2018).

## 2018-08-03 ENCOUNTER — ANTICOAGULATION VISIT (OUTPATIENT)
Dept: CARDIOLOGY | Facility: CLINIC | Age: 78
End: 2018-08-03

## 2018-08-07 ENCOUNTER — ANTICOAGULATION VISIT (OUTPATIENT)
Dept: CARDIOLOGY | Facility: CLINIC | Age: 78
End: 2018-08-07

## 2018-08-07 LAB — INR PPP: 2.4

## 2018-09-04 ENCOUNTER — ANTICOAGULATION VISIT (OUTPATIENT)
Dept: CARDIOLOGY | Facility: CLINIC | Age: 78
End: 2018-09-04

## 2018-09-04 LAB — INR PPP: 2.5

## 2018-10-05 ENCOUNTER — ANTICOAGULATION VISIT (OUTPATIENT)
Dept: CARDIOLOGY | Facility: CLINIC | Age: 78
End: 2018-10-05

## 2018-10-08 DIAGNOSIS — I48.91 ATRIAL FIBRILLATION, UNSPECIFIED TYPE (HCC): Primary | ICD-10-CM

## 2018-10-09 ENCOUNTER — ANTICOAGULATION VISIT (OUTPATIENT)
Dept: CARDIOLOGY | Facility: CLINIC | Age: 78
End: 2018-10-09

## 2018-10-09 LAB — INR PPP: 2.6

## 2018-10-09 RX ORDER — WARFARIN SODIUM 5 MG/1
TABLET ORAL
Qty: 45 TABLET | Refills: 5 | Status: SHIPPED | OUTPATIENT
Start: 2018-10-09 | End: 2019-07-08 | Stop reason: SDUPTHER

## 2018-11-05 ENCOUNTER — ANTICOAGULATION VISIT (OUTPATIENT)
Dept: CARDIOLOGY | Facility: CLINIC | Age: 78
End: 2018-11-05

## 2018-11-05 LAB — INR PPP: 1.9

## 2018-11-12 ENCOUNTER — ANTICOAGULATION VISIT (OUTPATIENT)
Dept: CARDIOLOGY | Facility: CLINIC | Age: 78
End: 2018-11-12

## 2018-11-12 LAB — INR PPP: 5.2

## 2018-11-16 ENCOUNTER — ANTICOAGULATION VISIT (OUTPATIENT)
Dept: CARDIOLOGY | Facility: CLINIC | Age: 78
End: 2018-11-16

## 2018-11-16 LAB — INR PPP: 2.3

## 2018-12-06 ENCOUNTER — ANTICOAGULATION VISIT (OUTPATIENT)
Dept: CARDIOLOGY | Facility: CLINIC | Age: 78
End: 2018-12-06

## 2018-12-06 LAB — INR PPP: 2.5

## 2019-01-07 LAB — INR PPP: 2.9

## 2019-01-08 ENCOUNTER — ANTICOAGULATION VISIT (OUTPATIENT)
Dept: CARDIOLOGY | Facility: CLINIC | Age: 79
End: 2019-01-08

## 2019-01-09 ENCOUNTER — OFFICE VISIT (OUTPATIENT)
Dept: CARDIOLOGY | Facility: CLINIC | Age: 79
End: 2019-01-09

## 2019-01-09 VITALS
WEIGHT: 129 LBS | BODY MASS INDEX: 18.47 KG/M2 | HEART RATE: 84 BPM | DIASTOLIC BLOOD PRESSURE: 60 MMHG | SYSTOLIC BLOOD PRESSURE: 130 MMHG | HEIGHT: 70 IN

## 2019-01-09 DIAGNOSIS — I48.91 ATRIAL FIBRILLATION, UNSPECIFIED TYPE (HCC): Primary | ICD-10-CM

## 2019-01-09 DIAGNOSIS — Z95.5 STENTED CORONARY ARTERY: ICD-10-CM

## 2019-01-09 DIAGNOSIS — R06.09 DYSPNEA ON EXERTION: ICD-10-CM

## 2019-01-09 DIAGNOSIS — Z95.1 S/P CABG X 3: ICD-10-CM

## 2019-01-09 DIAGNOSIS — I10 ESSENTIAL HYPERTENSION: ICD-10-CM

## 2019-01-09 DIAGNOSIS — I25.10 CORONARY ARTERY DISEASE INVOLVING NATIVE CORONARY ARTERY OF NATIVE HEART WITHOUT ANGINA PECTORIS: ICD-10-CM

## 2019-01-09 DIAGNOSIS — J41.0 SIMPLE CHRONIC BRONCHITIS (HCC): ICD-10-CM

## 2019-01-09 PROCEDURE — 93000 ELECTROCARDIOGRAM COMPLETE: CPT | Performed by: INTERNAL MEDICINE

## 2019-01-09 PROCEDURE — 99214 OFFICE O/P EST MOD 30 MIN: CPT | Performed by: INTERNAL MEDICINE

## 2019-01-09 RX ORDER — NITROGLYCERIN 0.4 MG/1
TABLET SUBLINGUAL
Qty: 100 TABLET | Refills: 11 | Status: SHIPPED | OUTPATIENT
Start: 2019-01-09

## 2019-01-09 NOTE — PROGRESS NOTES
Emelina Phillips  [id number]  1940  78 y.o.  male    Referring Provider: Phil Pringle MD    Reason for Follow-up Visit: Here for routine follow up   Chronic Atrial Fibrillation  coronary artery disease Coronary artery bypass grafting stented coronary artery   COPD      Subjective    Mild chronic exertional shortness of breath on exertion relieved with rest  No significant cough or wheezing  Going on for several months or longer    No palpitations  No associated chest pain  No significant pedal edema    No fever or chills  No significant expectoration    No hemoptysis  No presyncope or syncope     Joint pain in small, medium and large joints    History of present illness:  Emelina Phillips is a 78 y.o. yo male with history of chronic atrial fibrillation COPD  who presents today for   Chief Complaint   Patient presents with   • Coronary Artery Disease     6 MON FU    .    History  Past Medical History:   Diagnosis Date   • A-fib (CMS/[facility])    • Abnormal cardiovascular function study     lexiscan   • CAD (coronary artery disease)    • CAD in native artery    • Chest pain    • Chest pain    • COPD (chronic obstructive pulmonary disease) (CMS/[facility])    • Emphysema/COPD (CMS/[facility])    • Hematochezia    • Hematochezia    • Hyperlipidemia    • Hyperlipidemia, mixed    • Hypertension    • Hypertension, benign    • S/P CABG (coronary artery bypass graft)    • Shortness of breath    ,   Past Surgical History:   Procedure Laterality Date   • CARDIAC CATHETERIZATION Left 04/14/2011   • CARDIAC CATHETERIZATION Left 01/23/2001    stent to RCA   • CORONARY ARTERY BYPASS GRAFT      x 1   • CORONARY STENT PLACEMENT      x1   • OTHER SURGICAL HISTORY  05/24/2011    [facility] STATUS, AORTOCORONARY BYPASS -  grafting - 3 vessel   • TONSILLECTOMY     ,   Family History   Problem Relation Age of Onset   • Heart disease Father    • Coronary artery disease Father    • Heart disease Brother    • Cancer Brother    • Coronary artery disease  Brother    • Heart disease Mother    • Cancer Brother    ,   Social History     Tobacco Use   • Smoking status: Former Smoker     Packs/day: 1.50     Years: 50.00     Pack years: 75.00     Types: Cigarettes     Last attempt to quit:      Years since quittin.0   • Smokeless tobacco: Never Used   Substance Use Topics   • Alcohol use: No   • Drug use: No   ,     Medications  Current Outpatient Medications   Medication Sig Dispense Refill   • atorvastatin (LIPITOR) 10 MG tablet Take 10 mg by mouth Daily.     • budesonide-formoterol (SYMBICORT) 160-4.5 MCG/ACT inhaler Inhale 2 puffs 2 (Two) Times a Day.     • DIGOX 125 MCG tablet Take 1 tablet by mouth every day 90 tablet 2   • hydrochlorothiazide (MICROZIDE) 12.5 MG capsule Take 1 capsule by mouth Daily. 90 capsule 3   • ipratropium-albuterol (DUO-NEB) 0.5-2.5 mg/mL nebulizer As Needed.  5   • losartan (COZAAR) 100 MG tablet Take 1 tablet by mouth Daily. 90 tablet 3   • metoprolol tartrate (LOPRESSOR) 100 MG tablet Take 100 mg by mouth 2 (Two) Times a Day.     • warfarin (COUMADIN) 5 MG tablet TAKE 1 TABLET BY MOUTH ONCE A DAY, EXCEPT TAKE 1/2 TABLET ON SUNDAYS. 45 tablet 5     No current facility-administered medications for this visit.        Allergies:  Patient has no known allergies.    Review of Systems  Review of Systems   Constitution: Positive for malaise/fatigue and weight gain. Negative for weight loss.   HENT: Negative.    Eyes: Negative.    Cardiovascular: Positive for dyspnea on exertion. Negative for chest pain, claudication, cyanosis, irregular heartbeat, leg swelling, near-syncope, orthopnea, palpitations, paroxysmal nocturnal dyspnea and syncope.   Respiratory: Negative.    Endocrine: Negative.    Hematologic/Lymphatic: Negative.    Skin: Negative.    Gastrointestinal: Negative for anorexia.   Genitourinary: Negative.    Neurological: Negative.    Psychiatric/Behavioral: Negative.        Objective     Physical Exam:  /60   Pulse 84   Ht  "177.8 cm (70\")   Wt 58.5 kg (129 lb)   BMI 18.51 kg/m²   Physical Exam   Constitutional: He appears well-developed.   HENT:   Head: Normocephalic.   Neck: Normal carotid pulses and no JVD present. No tracheal tenderness present. Carotid bruit is not present. No tracheal deviation and no edema present.   Cardiovascular: Normal pulses. An irregularly irregular rhythm present.   Murmur heard.   Systolic murmur is present with a grade of 2/6.  Pulmonary/Chest: Effort normal. No stridor.   Abdominal: Soft.   Neurological: He is alert. He has normal strength. No cranial nerve deficit or sensory deficit.   Skin: Skin is warm.   Psychiatric: He has a normal mood and affect. His speech is normal and behavior is normal.       Results Review:  Results for orders placed during the hospital encounter of 01/26/16   SCANNED - ECHOCARDIOGRAM          ECG 12 Lead  Date/Time: 1/9/2019 9:45 AM  Performed by: Alcides Rodriguez MD  Authorized by: Alcides Rodriguez MD   Comparison: compared with previous ECG from 6/25/2018  Similar to previous ECG  Rhythm: atrial fibrillation  Rate: normal  Conduction: complete RBBB  T depression: II, aVR and aVF  QRS axis: normal  Clinical impression: abnormal ECG            Assessment/Plan   Patient Active Problem List   Diagnosis   • Atrial fibrillation [I48.91]   • Essential hypertension   • Simple chronic bronchitis (CMS/HCC)   • Coronary artery disease involving native coronary artery of native heart without angina pectoris   • S/P CABG x 3   • Dyspnea on exertion   • Stented coronary artery       Plan          Start ECASA 81 mg daily regimen given risk factors and monitor for any signs of bleeding including red or dark stools. Fall precautions.      No additional cardiac testing required at this point in time.      S/L NTG PRN for chest pain, call me or go to ER if has to use S/L nitroglycerin   Requested Prescriptions     Signed Prescriptions Disp Refills   • nitroglycerin (NITROSTAT) 0.4 MG SL tablet " 100 tablet 11     Si under the tongue as needed for angina, may repeat q5mins for up three doses      Keep LDL below 70 mg/dl. Monitor liver and renal functions.   Monitor CBC, CMP, TSH (as indicated) and Lipid Panel by primary     xxxxxxxxxxxxxxxxxxxxxxxxxxxxxxxxxxxxxxxxxxxxxxxxxxxxxxxxxxxxxxxxxxxxxxxxxxxxxxxxxxxxxxxxxxxxxxxxxxxxxxxxxxxxxxxxxxxxxxxxxxxxxxxxxxxxxxxxxxxxxxxxxxxxxxxxxxxxxxxxxxxxxxxxxxxxxxxxxxxxxxxxxxxxxxxxxxxxxxxxxxxxxxxxxxxxxxxxxxxxxxxxxxxxxxxxxxxxxxxxxxxxxxxx  Health maintenance    Low vitamin K diet.  knowing what to eat, tips what foods to avoid.  Target INR 2-3     The patient is advised to reduce or avoid caffeine or other cardiac stimulants.     The patient was advised that NSAID-type medications have three  very important potential side effects: cardiovascular complications, gastrointestinal irritation including hemorrhage and renal injuries.  Do not use anti-inflammatories such as Motrin/ibuprofen, Alleve/naprosyn, Mobic and like medications Use Tylenol instead.The patient expresses understanding of these issues and questions were answered.   Monitor for any signs of bleeding including red or dark stools. Fall precautions.       Monitor for any signs of bleeding including red or dark stools. Fall precautions.   Patient is asked to monitor BP at home or work, several times per month and return with written values at next office visit.     Advised staying uptodate with immunizations per established standard guidelines.      Offered to give patient  a copy of my notes and relevant tests/ prior ECG etc for the patient to review and follow specific advise and relevant findings if any, prognosis, along with my current and future plans.      Questions were encouraged, asked and answered to the patient's  understanding and satisfaction. Questions if any regarding current medications and side effects, need for refills and importance of compliance to medications stressed.    Reviewed available  prior notes, consults, prior visits, laboratory findings, radiology and cardiology relevant reports. Updated chart as applicable. I have reviewed the patient's medical history in detail and updated the computerized patient record as relevant.      Updated patient regarding any new or relevant abnormalities on review of records or any new findings on physical exam. Mentioned to patient about purpose of visit and desirable health short and long term goals and objectives.        xxxxxxxxxxxxxxxxxxxxxxxxxxxxxxxxxxxxxxxxxxxxxxxxxxxxxxxxxxxxxxxxxxxxxxxxxxxxxxxxxxxxxxxxxxxxxxxxxxxxxxxxxxxxxxxxxxxxxxxxxxxxxxxxxxxxxxxxxxxxxxxxxxxxxxxxxxxxxxxxxxxxxxxxxxxxxxxxxxxxxxxxxxxxxxxxxxxxxxxxxxxxxxxxxxxxxxxxxxxxxxxxxxxxxxxxxxxxxxxxxxxxxxxx        No Follow-up on file.

## 2019-02-07 ENCOUNTER — ANTICOAGULATION VISIT (OUTPATIENT)
Dept: CARDIOLOGY | Facility: CLINIC | Age: 79
End: 2019-02-07

## 2019-02-07 LAB — INR PPP: 2.3

## 2019-03-07 ENCOUNTER — ANTICOAGULATION VISIT (OUTPATIENT)
Dept: CARDIOLOGY | Facility: CLINIC | Age: 79
End: 2019-03-07

## 2019-03-07 LAB — INR PPP: 2.7

## 2019-04-09 ENCOUNTER — ANTICOAGULATION VISIT (OUTPATIENT)
Dept: CARDIOLOGY | Facility: CLINIC | Age: 79
End: 2019-04-09

## 2019-04-09 LAB — INR PPP: 2.5

## 2019-05-09 ENCOUNTER — ANTICOAGULATION VISIT (OUTPATIENT)
Dept: CARDIOLOGY | Facility: CLINIC | Age: 79
End: 2019-05-09

## 2019-05-09 LAB — INR PPP: 2.2

## 2019-06-07 ENCOUNTER — ANTICOAGULATION VISIT (OUTPATIENT)
Dept: CARDIOLOGY | Facility: CLINIC | Age: 79
End: 2019-06-07

## 2019-06-07 LAB — INR PPP: 2.3

## 2019-07-09 ENCOUNTER — ANTICOAGULATION VISIT (OUTPATIENT)
Dept: CARDIOLOGY | Facility: CLINIC | Age: 79
End: 2019-07-09

## 2019-07-09 LAB — INR PPP: 2.3

## 2019-07-09 RX ORDER — WARFARIN SODIUM 5 MG/1
TABLET ORAL
Qty: 45 TABLET | Refills: 5 | Status: SHIPPED | OUTPATIENT
Start: 2019-07-09

## 2019-08-08 ENCOUNTER — ANTICOAGULATION VISIT (OUTPATIENT)
Dept: CARDIOLOGY | Facility: CLINIC | Age: 79
End: 2019-08-08

## 2019-08-08 LAB — INR PPP: 2.2

## 2019-09-12 ENCOUNTER — ANTICOAGULATION VISIT (OUTPATIENT)
Dept: CARDIOLOGY | Facility: CLINIC | Age: 79
End: 2019-09-12

## 2019-09-12 LAB — INR PPP: 2.5

## 2019-10-10 ENCOUNTER — ANTICOAGULATION VISIT (OUTPATIENT)
Dept: CARDIOLOGY | Facility: CLINIC | Age: 79
End: 2019-10-10

## 2019-10-10 LAB — INR PPP: 3

## 2019-11-06 ENCOUNTER — ANTICOAGULATION VISIT (OUTPATIENT)
Dept: CARDIOLOGY | Facility: CLINIC | Age: 79
End: 2019-11-06

## 2019-11-06 LAB — INR PPP: 1.7

## 2019-11-20 ENCOUNTER — ANTICOAGULATION VISIT (OUTPATIENT)
Dept: CARDIOLOGY | Facility: CLINIC | Age: 79
End: 2019-11-20

## 2019-11-20 LAB — INR PPP: 4.6

## 2019-11-26 ENCOUNTER — ANTICOAGULATION VISIT (OUTPATIENT)
Dept: CARDIOLOGY | Facility: CLINIC | Age: 79
End: 2019-11-26

## 2019-11-26 LAB — INR PPP: 1.5

## 2019-12-05 ENCOUNTER — ANTICOAGULATION VISIT (OUTPATIENT)
Dept: CARDIOLOGY | Facility: CLINIC | Age: 79
End: 2019-12-05

## 2019-12-05 LAB — INR PPP: 1.6

## 2019-12-17 ENCOUNTER — ANTICOAGULATION VISIT (OUTPATIENT)
Dept: CARDIOLOGY | Facility: CLINIC | Age: 79
End: 2019-12-17

## 2019-12-19 ENCOUNTER — ANTICOAGULATION VISIT (OUTPATIENT)
Dept: CARDIOLOGY | Facility: CLINIC | Age: 79
End: 2019-12-19

## 2019-12-19 LAB — INR PPP: 2.1

## 2020-01-03 ENCOUNTER — ANTICOAGULATION VISIT (OUTPATIENT)
Dept: CARDIOLOGY | Facility: CLINIC | Age: 80
End: 2020-01-03

## 2020-01-06 ENCOUNTER — ANTICOAGULATION VISIT (OUTPATIENT)
Dept: CARDIOLOGY | Facility: CLINIC | Age: 80
End: 2020-01-06

## 2020-01-06 LAB — INR PPP: 5.2

## 2020-01-09 ENCOUNTER — ANTICOAGULATION VISIT (OUTPATIENT)
Dept: CARDIOLOGY | Facility: CLINIC | Age: 80
End: 2020-01-09

## 2020-01-10 ENCOUNTER — ANTICOAGULATION VISIT (OUTPATIENT)
Dept: CARDIOLOGY | Facility: CLINIC | Age: 80
End: 2020-01-10

## 2020-01-10 LAB — INR PPP: 1.4

## 2020-01-17 ENCOUNTER — ANTICOAGULATION VISIT (OUTPATIENT)
Dept: CARDIOLOGY | Facility: CLINIC | Age: 80
End: 2020-01-17

## 2020-01-20 ENCOUNTER — ANTICOAGULATION VISIT (OUTPATIENT)
Dept: CARDIOLOGY | Facility: CLINIC | Age: 80
End: 2020-01-20

## 2020-01-20 DIAGNOSIS — I48.91 ATRIAL FIBRILLATION, UNSPECIFIED TYPE (HCC): Primary | ICD-10-CM

## 2020-01-20 LAB — INR PPP: 1.5

## 2020-01-22 ENCOUNTER — TRANSCRIBE ORDERS (OUTPATIENT)
Dept: ADMINISTRATIVE | Facility: HOSPITAL | Age: 80
End: 2020-01-22

## 2020-01-22 DIAGNOSIS — R06.02 SHORTNESS OF BREATH: Primary | ICD-10-CM

## 2020-01-24 ENCOUNTER — ANTICOAGULATION VISIT (OUTPATIENT)
Dept: CARDIOLOGY | Facility: CLINIC | Age: 80
End: 2020-01-24

## 2020-01-24 ENCOUNTER — APPOINTMENT (OUTPATIENT)
Dept: CT IMAGING | Facility: HOSPITAL | Age: 80
End: 2020-01-24

## 2020-01-24 LAB
INR PPP: 1.4
INR PPP: 1.4

## 2020-01-27 ENCOUNTER — HOSPITAL ENCOUNTER (OUTPATIENT)
Dept: CT IMAGING | Facility: HOSPITAL | Age: 80
Discharge: HOME | End: 2020-01-27
Admitting: GENERAL PRACTICE

## 2020-01-27 ENCOUNTER — ANTICOAGULATION VISIT (OUTPATIENT)
Dept: CARDIOLOGY | Facility: CLINIC | Age: 80
End: 2020-01-27

## 2020-01-27 LAB — CREAT BLDA-MCNC: 0.8 MG/DL (ref 0.6–1.3)

## 2020-01-27 PROCEDURE — 71260 CT THORAX DX C+: CPT

## 2020-01-27 PROCEDURE — 82565 ASSAY OF CREATININE: CPT

## 2020-01-27 PROCEDURE — 25010000002 IOPAMIDOL 61 % SOLUTION: Performed by: GENERAL PRACTICE

## 2020-01-27 RX ADMIN — IOPAMIDOL 100 ML: 612 INJECTION, SOLUTION INTRAVENOUS at 15:21

## 2020-01-31 ENCOUNTER — ANTICOAGULATION VISIT (OUTPATIENT)
Dept: CARDIOLOGY | Facility: CLINIC | Age: 80
End: 2020-01-31

## 2020-02-03 ENCOUNTER — ANTICOAGULATION VISIT (OUTPATIENT)
Dept: CARDIOLOGY | Facility: CLINIC | Age: 80
End: 2020-02-03

## 2020-02-03 LAB — INR PPP: 2.5

## 2020-02-10 ENCOUNTER — ANTICOAGULATION VISIT (OUTPATIENT)
Dept: CARDIOLOGY | Facility: CLINIC | Age: 80
End: 2020-02-10

## 2020-02-10 LAB — INR PPP: 1.4

## 2020-02-17 ENCOUNTER — ANTICOAGULATION VISIT (OUTPATIENT)
Dept: CARDIOLOGY | Facility: CLINIC | Age: 80
End: 2020-02-17

## 2020-02-17 LAB — INR PPP: 3.8

## 2020-02-24 ENCOUNTER — ANTICOAGULATION VISIT (OUTPATIENT)
Dept: CARDIOLOGY | Facility: CLINIC | Age: 80
End: 2020-02-24

## 2020-02-24 LAB — INR PPP: 3.8

## 2020-03-06 ENCOUNTER — ANTICOAGULATION VISIT (OUTPATIENT)
Dept: CARDIOLOGY | Facility: CLINIC | Age: 80
End: 2020-03-06

## 2020-03-11 ENCOUNTER — ANTICOAGULATION VISIT (OUTPATIENT)
Dept: CARDIOLOGY | Facility: CLINIC | Age: 80
End: 2020-03-11

## 2020-03-12 ENCOUNTER — ANTICOAGULATION VISIT (OUTPATIENT)
Dept: CARDIOLOGY | Facility: CLINIC | Age: 80
End: 2020-03-12

## 2020-03-12 LAB — INR PPP: 1.4

## 2020-06-22 ENCOUNTER — ANTICOAGULATION VISIT (OUTPATIENT)
Dept: CARDIOLOGY | Facility: CLINIC | Age: 80
End: 2020-06-22

## 2020-07-02 ENCOUNTER — ANTICOAGULATION VISIT (OUTPATIENT)
Dept: CARDIOLOGY | Facility: CLINIC | Age: 80
End: 2020-07-02

## 2020-07-06 DIAGNOSIS — I48.91 ATRIAL FIBRILLATION, UNSPECIFIED TYPE (HCC): Primary | ICD-10-CM

## 2020-07-08 ENCOUNTER — ANTICOAGULATION VISIT (OUTPATIENT)
Dept: CARDIOLOGY | Facility: CLINIC | Age: 80
End: 2020-07-08

## 2020-07-08 LAB — INR PPP: 1.2

## 2020-07-24 ENCOUNTER — ANTICOAGULATION VISIT (OUTPATIENT)
Dept: CARDIOLOGY | Facility: CLINIC | Age: 80
End: 2020-07-24

## 2020-07-24 LAB — INR PPP: 2.1

## 2020-08-05 ENCOUNTER — ANTICOAGULATION VISIT (OUTPATIENT)
Dept: CARDIOLOGY | Facility: CLINIC | Age: 80
End: 2020-08-05

## 2020-08-05 LAB — INR PPP: 1.3

## 2020-08-20 ENCOUNTER — ANTICOAGULATION VISIT (OUTPATIENT)
Dept: CARDIOLOGY | Facility: CLINIC | Age: 80
End: 2020-08-20

## 2020-09-09 ENCOUNTER — TRANSCRIBE ORDERS (OUTPATIENT)
Dept: ADMINISTRATIVE | Facility: HOSPITAL | Age: 80
End: 2020-09-09

## 2020-09-09 DIAGNOSIS — R91.1 LUNG NODULE: Primary | ICD-10-CM

## 2020-09-09 DIAGNOSIS — R06.02 SHORTNESS OF BREATH: ICD-10-CM

## 2020-09-14 ENCOUNTER — HOSPITAL ENCOUNTER (OUTPATIENT)
Dept: CT IMAGING | Facility: HOSPITAL | Age: 80
End: 2020-09-14

## 2020-09-25 ENCOUNTER — ANTICOAGULATION VISIT (OUTPATIENT)
Dept: CARDIOLOGY | Facility: CLINIC | Age: 80
End: 2020-09-25

## 2020-10-01 ENCOUNTER — ANTICOAGULATION VISIT (OUTPATIENT)
Dept: CARDIOLOGY | Facility: CLINIC | Age: 80
End: 2020-10-01

## 2020-10-09 ENCOUNTER — ANTICOAGULATION VISIT (OUTPATIENT)
Dept: CARDIOLOGY | Facility: CLINIC | Age: 80
End: 2020-10-09

## 2020-11-03 ENCOUNTER — ANTICOAGULATION VISIT (OUTPATIENT)
Dept: CARDIOLOGY | Facility: CLINIC | Age: 80
End: 2020-11-03

## 2020-12-04 ENCOUNTER — ANTICOAGULATION VISIT (OUTPATIENT)
Dept: CARDIOLOGY | Facility: CLINIC | Age: 80
End: 2020-12-04

## 2020-12-04 LAB — INR PPP: 1.5

## 2020-12-15 ENCOUNTER — ANTICOAGULATION VISIT (OUTPATIENT)
Dept: CARDIOLOGY | Facility: CLINIC | Age: 80
End: 2020-12-15

## 2020-12-31 ENCOUNTER — ANTICOAGULATION VISIT (OUTPATIENT)
Dept: CARDIOLOGY | Facility: CLINIC | Age: 80
End: 2020-12-31

## 2020-12-31 LAB — INR PPP: 1.4

## 2021-01-04 ENCOUNTER — ANTICOAGULATION VISIT (OUTPATIENT)
Dept: CARDIOLOGY | Facility: CLINIC | Age: 81
End: 2021-01-04

## 2021-01-04 LAB — INR PPP: 2.4

## 2021-02-01 ENCOUNTER — ANTICOAGULATION VISIT (OUTPATIENT)
Dept: CARDIOLOGY | Facility: CLINIC | Age: 81
End: 2021-02-01